# Patient Record
Sex: MALE | Race: BLACK OR AFRICAN AMERICAN | Employment: OTHER | URBAN - METROPOLITAN AREA
[De-identification: names, ages, dates, MRNs, and addresses within clinical notes are randomized per-mention and may not be internally consistent; named-entity substitution may affect disease eponyms.]

---

## 2017-01-29 ENCOUNTER — HOSPITAL ENCOUNTER (EMERGENCY)
Facility: HOSPITAL | Age: 42
Discharge: HOME | End: 2017-01-29
Payer: MEDICARE

## 2017-01-29 VITALS
RESPIRATION RATE: 16 BRPM | TEMPERATURE: 98 F | HEART RATE: 86 BPM | SYSTOLIC BLOOD PRESSURE: 123 MMHG | WEIGHT: 235 LBS | BODY MASS INDEX: 29.22 KG/M2 | DIASTOLIC BLOOD PRESSURE: 79 MMHG | HEIGHT: 75 IN | OXYGEN SATURATION: 99 %

## 2017-01-29 DIAGNOSIS — S90.425A: Primary | ICD-10-CM

## 2017-01-29 PROCEDURE — 99283 EMERGENCY DEPT VISIT LOW MDM: CPT

## 2017-01-29 RX ORDER — IBUPROFEN 600 MG/1
600 TABLET ORAL EVERY 6 HOURS PRN
Qty: 20 TABLET | Refills: 0 | Status: SHIPPED | OUTPATIENT
Start: 2017-01-29 | End: 2017-02-03

## 2017-01-29 RX ORDER — LISINOPRIL 20 MG/1
20 TABLET ORAL DAILY
COMMUNITY

## 2017-01-29 NOTE — ED INITIAL ASSESSMENT (HPI)
Patient states he feels like his the toes on his L foot are frostbitten, toes blackened at time of triage, patient states it was extremely painful but now they are numb

## 2017-01-29 NOTE — ED NOTES
Pt with c/o numbness to tips and blackened skin to to left 1st and 2nd toes for past 2 weeks, pt reports his feet got cold while outside 2 weeks ago,   He was outside for approx 1 hours, was wearing boots, but not wearing socks  Pedal pulse present, toes m

## 2017-01-29 NOTE — ED PROVIDER NOTES
Patient Seen in: [facility] AND [facility] Emergency Department    History   CC: darkened toes  HPI: Cam Lee 42year old male  who presents to the ER c/o left foot first and second distal toe \"black areas\" for the past couple of weeks.   Patient states a c to the distal tip of the patient's left great toe. No nail involvement. No open wounds. The left distal first and second toes appear with darker pigmentation in the rest of the foot. No surrounding erythema or edema is associated.   No tenderness associ

## 2017-04-01 ENCOUNTER — HOSPITAL ENCOUNTER (EMERGENCY)
Facility: HOSPITAL | Age: 42
Discharge: NURSING, REHAB OR LONG-TERM CARE | End: 2017-04-02
Attending: EMERGENCY MEDICINE
Payer: MEDICAID

## 2017-04-01 DIAGNOSIS — F32.A DEPRESSION, UNSPECIFIED DEPRESSION TYPE: Primary | ICD-10-CM

## 2017-04-01 DIAGNOSIS — T14.91XA SUICIDAL BEHAVIOR WITH ATTEMPTED SELF-INJURY (HCC): ICD-10-CM

## 2017-04-01 PROCEDURE — 81003 URINALYSIS AUTO W/O SCOPE: CPT | Performed by: EMERGENCY MEDICINE

## 2017-04-01 PROCEDURE — 83992 ASSAY FOR PHENCYCLIDINE: CPT | Performed by: EMERGENCY MEDICINE

## 2017-04-01 PROCEDURE — 80320 DRUG SCREEN QUANTALCOHOLS: CPT | Performed by: EMERGENCY MEDICINE

## 2017-04-01 PROCEDURE — 99285 EMERGENCY DEPT VISIT HI MDM: CPT

## 2017-04-01 PROCEDURE — 80048 BASIC METABOLIC PNL TOTAL CA: CPT | Performed by: EMERGENCY MEDICINE

## 2017-04-01 PROCEDURE — 85025 COMPLETE CBC W/AUTO DIFF WBC: CPT | Performed by: EMERGENCY MEDICINE

## 2017-04-01 PROCEDURE — 36415 COLL VENOUS BLD VENIPUNCTURE: CPT

## 2017-04-01 PROCEDURE — 80307 DRUG TEST PRSMV CHEM ANLYZR: CPT | Performed by: EMERGENCY MEDICINE

## 2017-04-02 VITALS
WEIGHT: 230 LBS | OXYGEN SATURATION: 100 % | RESPIRATION RATE: 18 BRPM | SYSTOLIC BLOOD PRESSURE: 123 MMHG | BODY MASS INDEX: 29.52 KG/M2 | HEIGHT: 74 IN | DIASTOLIC BLOOD PRESSURE: 81 MMHG | HEART RATE: 65 BPM

## 2017-04-02 NOTE — BH PROGRESS NOTE
Pt accepted at [facility]  Is Dr. Arnold [facility] 322-1    RN to RN: [phone]

## 2017-04-02 NOTE — ED INITIAL ASSESSMENT (HPI)
Ems called for pt outside of his house w/ no shirt on. EMS states he is bipolar and has not been taking his meds.  No hi/si

## 2017-04-02 NOTE — BH PROGRESS NOTE
[facility] does not have a bed  [facility] - reached [facility] does not have a bed    Faxed clinical packet to [facility]   Awaiting response for pt transfer

## 2017-04-02 NOTE — BH LEVEL OF CARE ASSESSMENT
Comprehensive Assessment Note      General Questions  Why are you here?: \"I had an arguement and that's about it. \" Pt reports laying the street because \"I was sad. \"    Precipitating Events: Pt's mother called EMS because pt was laying outside dressed i Suicidal Ideation: No  Date of Most Recent Suicidal Ideation:  (N/A)  Describe Current/Recent Suicidal Ideation: Pt reports no SI, but EMS was called by pt's mother for laying in the street today dressed in only shorts    Current/Recent/Future Suicide Plan Allegations of Inappropriate Physical Contact: No  Current/Recent Destructive Behavior Toward Property: No  Past Destructive Behavior Toward Property: No  Danger to Others/Property Collateral Provided By: Amilcar Madrigal pt's mother    Describe Danger to O beer/mixed drink or two every three to 4 days, pt reports that he has had more then 5 drinks at one time but that does not happen often, could not be more specific)  Tobacco Use: Current smoker  Type: Cigarette  Average Packs/Day: 0.2  Number of Years: 32 in hospital bed)  Speech Characteristics: Normal rate;Normal rhythm; Soft  Concentration: Unimpaired  Memory: Recent memory intact; Remote memory intact (Memory appears to be intact, though the pt was not truthful)  Orientation Level: Oriented X4;Oriented to History: No  12. Poor Social Support: Yes  13.  Alcohol or Drug Abuse: No  Factors Mitigating Risk  Factors Mitigating Risk: Pt did not report being suicidal therefore has no mitigating factors   SRAT Review  Precautions: Suicide

## 2017-04-02 NOTE — ED PROVIDER NOTES
Patient Seen in: [facility] AND [facility] Emergency Department    History   Patient presents with:  Eval-P (psychiatric)    Stated Complaint:     HPI    40-year-old male with history of hypertension, diabetes, and bipolar disorder presents by paramedics after h distress  Eyes: pupils equal and round no pallor or injection  ENT, Mouth: mucous membranes moist  Respiratory: there are no retractions, lungs are clear to auscultation  Cardiovascular: regular rate and rhythm  Gastrointestinal:  abdomen is soft and non t MDM     Patient is medically cleared for inpatient psychiatric care. Plan to transfer the patient for inpatient psychiatric care given what seems to be a suicidal gesture.       Disposition and Plan     Clinical Impression:  Depression, unspecified d

## 2017-04-02 NOTE — ED NOTES
Pt arrives via EMS from home. Per medics, pt's mom called ambulance because pt was sitting outside in the street w/out a shirt on. Per medics pt has a hx of bipolar and has not been taking his medication.  Upon arrival pt is withdrawn only nodding his head

## 2018-03-18 ENCOUNTER — APPOINTMENT (OUTPATIENT)
Dept: ULTRASOUND IMAGING | Facility: HOSPITAL | Age: 43
End: 2018-03-18
Attending: EMERGENCY MEDICINE
Payer: MEDICARE

## 2018-03-18 ENCOUNTER — HOSPITAL ENCOUNTER (EMERGENCY)
Facility: HOSPITAL | Age: 43
Discharge: HOME | End: 2018-03-18
Attending: EMERGENCY MEDICINE
Payer: MEDICARE

## 2018-03-18 VITALS
TEMPERATURE: 98 F | RESPIRATION RATE: 16 BRPM | OXYGEN SATURATION: 99 % | HEART RATE: 76 BPM | DIASTOLIC BLOOD PRESSURE: 79 MMHG | SYSTOLIC BLOOD PRESSURE: 121 MMHG

## 2018-03-18 DIAGNOSIS — I82.432 ACUTE DEEP VEIN THROMBOSIS (DVT) OF POPLITEAL VEIN OF LEFT LOWER EXTREMITY (HCC): Primary | ICD-10-CM

## 2018-03-18 LAB
ANION GAP SERPL CALC-SCNC: 8 MMOL/L (ref 0–18)
APTT PPP: 26 SECONDS (ref 23.2–35.3)
BASOPHILS # BLD: 0 K/UL (ref 0–0.2)
BASOPHILS NFR BLD: 0 %
BUN SERPL-MCNC: 9 MG/DL (ref 8–20)
BUN/CREAT SERPL: 10.7 (ref 10–20)
CALCIUM SERPL-MCNC: 8.9 MG/DL (ref 8.5–10.5)
CHLORIDE SERPL-SCNC: 103 MMOL/L (ref 95–110)
CO2 SERPL-SCNC: 27 MMOL/L (ref 22–32)
CREAT SERPL-MCNC: 0.84 MG/DL (ref 0.5–1.5)
EOSINOPHIL # BLD: 0.2 K/UL (ref 0–0.7)
EOSINOPHIL NFR BLD: 3 %
ERYTHROCYTE [DISTWIDTH] IN BLOOD BY AUTOMATED COUNT: 15.2 % (ref 11–15)
GLUCOSE SERPL-MCNC: 117 MG/DL (ref 70–99)
HCT VFR BLD AUTO: 40.6 % (ref 41–52)
HGB BLD-MCNC: 13.3 G/DL (ref 13.5–17.5)
INR BLD: 1.1 (ref 0.9–1.2)
LYMPHOCYTES # BLD: 1.4 K/UL (ref 1–4)
LYMPHOCYTES NFR BLD: 26 %
MCH RBC QN AUTO: 25.7 PG (ref 27–32)
MCHC RBC AUTO-ENTMCNC: 32.8 G/DL (ref 32–37)
MCV RBC AUTO: 78.4 FL (ref 80–100)
MONOCYTES # BLD: 0.3 K/UL (ref 0–1)
MONOCYTES NFR BLD: 6 %
NEUTROPHILS # BLD AUTO: 3.5 K/UL (ref 1.8–7.7)
NEUTROPHILS NFR BLD: 64 %
OSMOLALITY UR CALC.SUM OF ELEC: 286 MOSM/KG (ref 275–295)
PLATELET # BLD AUTO: 340 K/UL (ref 140–400)
PMV BLD AUTO: 7.7 FL (ref 7.4–10.3)
POTASSIUM SERPL-SCNC: 3.7 MMOL/L (ref 3.3–5.1)
PROTHROMBIN TIME: 13.8 SECONDS (ref 11.8–14.5)
RBC # BLD AUTO: 5.18 M/UL (ref 4.5–5.9)
SODIUM SERPL-SCNC: 138 MMOL/L (ref 136–144)
WBC # BLD AUTO: 5.5 K/UL (ref 4–11)

## 2018-03-18 PROCEDURE — 80048 BASIC METABOLIC PNL TOTAL CA: CPT | Performed by: EMERGENCY MEDICINE

## 2018-03-18 PROCEDURE — 85730 THROMBOPLASTIN TIME PARTIAL: CPT | Performed by: EMERGENCY MEDICINE

## 2018-03-18 PROCEDURE — 85610 PROTHROMBIN TIME: CPT | Performed by: EMERGENCY MEDICINE

## 2018-03-18 PROCEDURE — 99284 EMERGENCY DEPT VISIT MOD MDM: CPT

## 2018-03-18 PROCEDURE — 36415 COLL VENOUS BLD VENIPUNCTURE: CPT

## 2018-03-18 PROCEDURE — 93971 EXTREMITY STUDY: CPT | Performed by: EMERGENCY MEDICINE

## 2018-03-18 PROCEDURE — 85025 COMPLETE CBC W/AUTO DIFF WBC: CPT | Performed by: EMERGENCY MEDICINE

## 2018-03-18 NOTE — ED PROVIDER NOTES
Patient Seen in: [facility] AND [facility] Emergency Department    History   Patient presents with:  Swelling Edema (cardiovascular, metabolic)    Stated Complaint: L leg swelling, calf pain     HPI    She presents emergency department complaining of left calf a distension. There is no tenderness. Musculoskeletal:   There is swelling and edema of the left lower leg from the ankle to the knee. There is no joint involvement. There is no erythema or warmth. There is no cellulitic changes.   There are strong pulse ED. Will d/c on same.        Disposition and Plan     Clinical Impression:  Acute deep vein thrombosis (DVT) of popliteal vein of left lower extremity ([address]  (primary encounter diagnosis)    Disposition:  Discharge  3/18/2018  4:20 pm    Follow-up:  Hawa Villegas

## 2018-05-03 ENCOUNTER — APPOINTMENT (OUTPATIENT)
Dept: ULTRASOUND IMAGING | Facility: HOSPITAL | Age: 43
End: 2018-05-03
Payer: MEDICARE

## 2018-05-03 ENCOUNTER — HOSPITAL ENCOUNTER (EMERGENCY)
Facility: HOSPITAL | Age: 43
Discharge: HOME | End: 2018-05-03
Payer: MEDICARE

## 2018-05-03 ENCOUNTER — APPOINTMENT (OUTPATIENT)
Dept: GENERAL RADIOLOGY | Facility: HOSPITAL | Age: 43
End: 2018-05-03
Payer: MEDICARE

## 2018-05-03 VITALS
DIASTOLIC BLOOD PRESSURE: 98 MMHG | HEIGHT: 74 IN | OXYGEN SATURATION: 100 % | SYSTOLIC BLOOD PRESSURE: 133 MMHG | HEART RATE: 76 BPM | RESPIRATION RATE: 18 BRPM | TEMPERATURE: 98 F | BODY MASS INDEX: 28.62 KG/M2 | WEIGHT: 223 LBS

## 2018-05-03 DIAGNOSIS — J40 BRONCHITIS: Primary | ICD-10-CM

## 2018-05-03 PROCEDURE — 93005 ELECTROCARDIOGRAM TRACING: CPT

## 2018-05-03 PROCEDURE — 93971 EXTREMITY STUDY: CPT

## 2018-05-03 PROCEDURE — 80048 BASIC METABOLIC PNL TOTAL CA: CPT

## 2018-05-03 PROCEDURE — 71046 X-RAY EXAM CHEST 2 VIEWS: CPT

## 2018-05-03 PROCEDURE — 94640 AIRWAY INHALATION TREATMENT: CPT

## 2018-05-03 PROCEDURE — 36415 COLL VENOUS BLD VENIPUNCTURE: CPT

## 2018-05-03 PROCEDURE — 99284 EMERGENCY DEPT VISIT MOD MDM: CPT

## 2018-05-03 PROCEDURE — 85025 COMPLETE CBC W/AUTO DIFF WBC: CPT

## 2018-05-03 PROCEDURE — 93010 ELECTROCARDIOGRAM REPORT: CPT | Performed by: INTERNAL MEDICINE

## 2018-05-03 RX ORDER — PREDNISONE 20 MG/1
40 TABLET ORAL DAILY
Qty: 8 TABLET | Refills: 0 | Status: SHIPPED | OUTPATIENT
Start: 2018-05-03 | End: 2018-05-07

## 2018-05-03 RX ORDER — PREDNISONE 20 MG/1
40 TABLET ORAL ONCE
Status: COMPLETED | OUTPATIENT
Start: 2018-05-03 | End: 2018-05-03

## 2018-05-03 RX ORDER — IPRATROPIUM BROMIDE AND ALBUTEROL SULFATE 2.5; .5 MG/3ML; MG/3ML
3 SOLUTION RESPIRATORY (INHALATION) ONCE
Status: COMPLETED | OUTPATIENT
Start: 2018-05-03 | End: 2018-05-03

## 2018-05-03 RX ORDER — ALBUTEROL SULFATE 90 UG/1
2 AEROSOL, METERED RESPIRATORY (INHALATION) EVERY 4 HOURS PRN
Qty: 1 INHALER | Refills: 0 | Status: SHIPPED | OUTPATIENT
Start: 2018-05-03 | End: 2018-06-02

## 2018-05-03 NOTE — ED INITIAL ASSESSMENT (HPI)
Pt was Dx with left leg DVT 3 weeks ago, 2 weeks ago noticed wheezing, denies SOB. Pt also noticed right left swelling today, denies chest pain, leg pain.

## 2018-05-03 NOTE — ED PROVIDER NOTES
Patient Seen in: [facility] AND [facility] Emergency Department    History   Patient presents with:  Deep Vein Thrombosis (cardiovascular)    Stated Complaint: Recent dx of Left leg DVT- was on eliquis and stopped per MD - now c/o right le*    HPI    37 yo male Neck: Normal range of motion. Neck supple. Cardiovascular: Normal rate, regular rhythm, normal heart sounds and intact distal pulses. Pulmonary/Chest: Effort normal and breath sounds normal. He has no wheezes.    Decreased air exchange   Abdominal: S Course as of May 03 1621  ------------------------------------------------------------      MDM   Ultrasound of the right leg negative for DVT. CXR no acute findings. reexam at 415. Air exchange improved. Discharge home on albuterol and prednisone.  P

## 2019-09-07 NOTE — ED INITIAL ASSESSMENT (HPI)
Patient arrives via EMS from home. Apparently he made a comment to his mother that somewhere along the lines that if he can't pay his bills, he'll drink anti-freeze to end his life.  An empty bottle of anti freeze was found in the house, but patient denies

## 2019-09-07 NOTE — ED NOTES
Patient calm and cooperative, security called for belongings check. Josh EDT at bedside for seclusion.

## 2019-09-07 NOTE — BH LEVEL OF CARE ASSESSMENT
Level of Care Assessment Note    General Questions  Why are you here?: \"I was angry and I told her I would drink antifreeze but I was not serious but she called the ambulance anyways. \"   Precipitating Events: Pt states that he is struggling to pay bills his life with the antifreeze today)  3. Have you been thinking about how you might kill yourself? (past 30 days): Yes(Pt denied but it was clear antifreeze was his plan or thought of means)  4.  Have you had these thoughts and had some intention of acting o access to means to attempt suicide or harm others or property: No  Access to Firearm/Weapon: No  Do you have a firearm owner ID card?: No    Self Injury  History of Self Injurious Behaviors: No  Present Self-Injurious Behaviors: No    Mental Health Symptom supportive place for recovery?: No     Withdrawal Symptoms  Current Withdrawal Symptoms: No                                                   Functional Impairment  Currently Attending School: No  Employment Status: Unemployed  Job Issues: (n/a)  Concerns/ with his mother. Pt stated that he did not mean it and he was hoping it would make his mother pay his bills for him. Pt states he has been struggling financially and was asking mother for help for the current time and she was not helping him.  Mother ended

## 2019-09-07 NOTE — ED PROVIDER NOTES
Patient Seen in: [facility] AND [facility] Emergency Department    History   Patient presents with:  Eval-P (psychiatric)    Stated Complaint: psych eval    HPI    Patient is a 41-year-old male he states he was sitting in a conversation with his mother.   He was and reactive to light. Neck: Neck supple. Cardiovascular: Normal rate, regular rhythm, normal heart sounds and intact distal pulses. Pulmonary/Chest: Effort normal and breath sounds normal. No respiratory distress. Abdominal: Soft.  Bowel sounds ar DRAW LAVENDER   RAINBOW DRAW LIGHT GREEN   RAINBOW DRAW GOLD          Patient's mother came in and spoke with [facility]. She filled out a petition as she feels patient is a risk for suicide.     MDM                 Disposition and Plan     Clinical

## 2019-09-08 NOTE — ED NOTES
[facility] - no beds  [facility] and [place] - no beds, no clinical given  [facility] - no beds, no clinical given

## 2019-09-08 NOTE — ED NOTES
Told by [facility] that the pt would have to be transferred out

## 2020-05-18 ENCOUNTER — HOSPITAL ENCOUNTER (EMERGENCY)
Facility: HOSPITAL | Age: 45
Discharge: HOME | End: 2020-05-18
Attending: EMERGENCY MEDICINE
Payer: MEDICARE

## 2020-05-18 VITALS
OXYGEN SATURATION: 99 % | BODY MASS INDEX: 28.88 KG/M2 | WEIGHT: 225 LBS | DIASTOLIC BLOOD PRESSURE: 88 MMHG | TEMPERATURE: 99 F | HEIGHT: 74 IN | RESPIRATION RATE: 16 BRPM | SYSTOLIC BLOOD PRESSURE: 124 MMHG | HEART RATE: 90 BPM

## 2020-05-18 DIAGNOSIS — E11.65 TYPE 2 DIABETES MELLITUS WITH HYPERGLYCEMIA, WITHOUT LONG-TERM CURRENT USE OF INSULIN (HCC): Primary | ICD-10-CM

## 2020-05-18 PROCEDURE — 82962 GLUCOSE BLOOD TEST: CPT

## 2020-05-18 PROCEDURE — 85025 COMPLETE CBC W/AUTO DIFF WBC: CPT | Performed by: EMERGENCY MEDICINE

## 2020-05-18 PROCEDURE — 96372 THER/PROPH/DIAG INJ SC/IM: CPT

## 2020-05-18 PROCEDURE — 80048 BASIC METABOLIC PNL TOTAL CA: CPT | Performed by: EMERGENCY MEDICINE

## 2020-05-18 PROCEDURE — 96360 HYDRATION IV INFUSION INIT: CPT

## 2020-05-18 PROCEDURE — 96361 HYDRATE IV INFUSION ADD-ON: CPT

## 2020-05-18 PROCEDURE — 81003 URINALYSIS AUTO W/O SCOPE: CPT | Performed by: EMERGENCY MEDICINE

## 2020-05-18 PROCEDURE — 99284 EMERGENCY DEPT VISIT MOD MDM: CPT

## 2020-05-18 RX ORDER — INSULIN ASPART 100 [IU]/ML
0.2 INJECTION, SOLUTION INTRAVENOUS; SUBCUTANEOUS ONCE
Status: COMPLETED | OUTPATIENT
Start: 2020-05-18 | End: 2020-05-18

## 2020-05-18 RX ORDER — SODIUM CHLORIDE 9 MG/ML
1000 INJECTION, SOLUTION INTRAVENOUS ONCE
Status: COMPLETED | OUTPATIENT
Start: 2020-05-18 | End: 2020-05-18

## 2020-05-18 NOTE — ED NOTES
Pt presents with c/o elevated blood glucose reading \"HI\" at home. Pt states he is supposed to take Metformin 1000mg BID instead he takes it once in the morning or at night. Last dose was last night. Education provided on proper frequency at this time.  Pt

## 2020-05-19 NOTE — ED PROVIDER NOTES
Patient Seen in: [facility] AND [facility] Emergency Department    History   Patient presents with:  Hyperglycemia      HPI    Patient presents to the ED complaining of high blood sugar readings.   He states that he has been feeling tired and more thirsty over th 1645 96 %   O2 Device 05/18/20 1645 None (Room air)       All measures to prevent infection transmission during my interaction with the patient were taken. The patient was already wearing a droplet mask on my arrival to the room.  Personal protective equipm GLUCOSE - Abnormal; Notable for the following components:    POC Glucose  459 (*)     All other components within normal limits   POCT GLUCOSE - Abnormal; Notable for the following components:    POC Glucose  294 (*)     All other components within normal Diagnostic Considerations: Hyperglycemia, DKA, electrolyte abnormalities    Medical Record Review: I personally reviewed available prior medical records for any recent pertinent discharge summaries, testing, and procedures and reviewed those reports.     Co

## 2020-05-23 ENCOUNTER — HOSPITAL ENCOUNTER (EMERGENCY)
Facility: HOSPITAL | Age: 45
Discharge: HOME | End: 2020-05-23
Attending: EMERGENCY MEDICINE
Payer: MEDICARE

## 2020-05-23 VITALS
OXYGEN SATURATION: 98 % | RESPIRATION RATE: 16 BRPM | TEMPERATURE: 98 F | DIASTOLIC BLOOD PRESSURE: 86 MMHG | HEART RATE: 85 BPM | SYSTOLIC BLOOD PRESSURE: 120 MMHG

## 2020-05-23 DIAGNOSIS — E11.65 TYPE 2 DIABETES MELLITUS WITH HYPERGLYCEMIA, WITHOUT LONG-TERM CURRENT USE OF INSULIN (HCC): Primary | ICD-10-CM

## 2020-05-23 PROCEDURE — 96360 HYDRATION IV INFUSION INIT: CPT

## 2020-05-23 PROCEDURE — 82962 GLUCOSE BLOOD TEST: CPT

## 2020-05-23 PROCEDURE — 83690 ASSAY OF LIPASE: CPT | Performed by: EMERGENCY MEDICINE

## 2020-05-23 PROCEDURE — 96361 HYDRATE IV INFUSION ADD-ON: CPT

## 2020-05-23 PROCEDURE — 85025 COMPLETE CBC W/AUTO DIFF WBC: CPT | Performed by: EMERGENCY MEDICINE

## 2020-05-23 PROCEDURE — 96372 THER/PROPH/DIAG INJ SC/IM: CPT

## 2020-05-23 PROCEDURE — 80076 HEPATIC FUNCTION PANEL: CPT | Performed by: EMERGENCY MEDICINE

## 2020-05-23 PROCEDURE — 99285 EMERGENCY DEPT VISIT HI MDM: CPT

## 2020-05-23 PROCEDURE — 80048 BASIC METABOLIC PNL TOTAL CA: CPT | Performed by: EMERGENCY MEDICINE

## 2020-05-23 RX ORDER — GLIPIZIDE 10 MG/1
TABLET ORAL
Qty: 30 TABLET | Refills: 0 | Status: SHIPPED | OUTPATIENT
Start: 2020-05-23

## 2020-05-23 RX ORDER — ONDANSETRON 4 MG/1
4 TABLET, ORALLY DISINTEGRATING ORAL EVERY 8 HOURS PRN
Qty: 10 TABLET | Refills: 0 | Status: SHIPPED | OUTPATIENT
Start: 2020-05-23 | End: 2020-05-30

## 2020-05-23 NOTE — ED INITIAL ASSESSMENT (HPI)
Pt c/o hyperglycemia \"almost 500\", pt is DM2. Pt takes oral meds, has been compliant. Pt recently discharged form [address] [place] [address] for same problem. Pt's only complaint is feeling fatigued.

## 2020-05-24 ENCOUNTER — TELEPHONE (OUTPATIENT)
Dept: ENDOCRINOLOGY CLINIC | Facility: CLINIC | Age: 45
End: 2020-05-24

## 2020-05-24 NOTE — ED NOTES
[facility] 2nd bolus of fluids infusing at this time. Needs repeat accucheck.

## 2020-05-24 NOTE — ED PROVIDER NOTES
Patient Seen in: [facility] AND [facility] Emergency Department      History   Patient presents with:  Hyperglycemia    Stated Complaint: high blood sugar    HPI    Patient presents to the emergency department stating that his blood sugars almost 500.   He states Rate and Rhythm: Normal rate and regular rhythm. Heart sounds: No murmur. Pulmonary:      Effort: Pulmonary effort is normal. No respiratory distress. Breath sounds: Normal breath sounds. Abdominal:      General: There is no distension. were created for panel order CBC WITH DIFFERENTIAL WITH PLATELET.   Procedure                               Abnormality         Status                     ---------                               -----------         ------                     CBC W/ DIFFERTANMAY

## 2020-05-25 NOTE — TELEPHONE ENCOUNTER
Patient was in the ER with hyperglycemia  Needs Fu in clinic  Please call and book with Severino Nicholson

## 2020-06-05 NOTE — TELEPHONE ENCOUNTER
Attempted to contact the patient, no answer, but no voicemail. Will call patient again at a later time.   Chart flagged to call again next week 6/8

## 2020-06-05 NOTE — TELEPHONE ENCOUNTER
Endo staff: please try to reach out to patient and schedule a post ED visit f/u with me    Thank you

## 2020-06-18 NOTE — TELEPHONE ENCOUNTER
Tried to call patient today. No answer and no voicemail. This is the 3ed attempt of calling patient to help schedule f/u apt s/p ED visit. Letter was generated and sent out to patients home address as listed on Chart review. Thank you.

## 2020-06-29 NOTE — TELEPHONE ENCOUNTER
Patient called back and relayed message from Dr. Rosy Rayo. He accepted below appointment.   Per patient he has the address to the office but RN still reviewed it with him    Future Appointments   Date Time Provider Ryan Tovar   7/6/2020  2:30 PM Is

## 2020-07-06 ENCOUNTER — OFFICE VISIT (OUTPATIENT)
Dept: ENDOCRINOLOGY CLINIC | Facility: CLINIC | Age: 45
End: 2020-07-06
Payer: MEDICARE

## 2020-07-06 VITALS
HEART RATE: 105 BPM | DIASTOLIC BLOOD PRESSURE: 90 MMHG | SYSTOLIC BLOOD PRESSURE: 132 MMHG | BODY MASS INDEX: 32 KG/M2 | WEIGHT: 245.81 LBS

## 2020-07-06 DIAGNOSIS — I10 ESSENTIAL HYPERTENSION: ICD-10-CM

## 2020-07-06 DIAGNOSIS — E11.65 TYPE 2 DIABETES MELLITUS WITH HYPERGLYCEMIA, WITHOUT LONG-TERM CURRENT USE OF INSULIN (HCC): Primary | ICD-10-CM

## 2020-07-06 LAB
CARTRIDGE LOT#: ABNORMAL NUMERIC
GLUCOSE BLOOD: 200
HEMOGLOBIN A1C: 11.8 % (ref 4.3–5.6)
TEST STRIP LOT #: NORMAL NUMERIC

## 2020-07-06 PROCEDURE — 83036 HEMOGLOBIN GLYCOSYLATED A1C: CPT | Performed by: NURSE PRACTITIONER

## 2020-07-06 PROCEDURE — 82962 GLUCOSE BLOOD TEST: CPT | Performed by: NURSE PRACTITIONER

## 2020-07-06 PROCEDURE — 99214 OFFICE O/P EST MOD 30 MIN: CPT | Performed by: NURSE PRACTITIONER

## 2020-07-06 PROCEDURE — 36416 COLLJ CAPILLARY BLOOD SPEC: CPT | Performed by: NURSE PRACTITIONER

## 2020-07-06 RX ORDER — PIOGLITAZONEHYDROCHLORIDE 30 MG/1
30 TABLET ORAL DAILY
Qty: 90 TABLET | Refills: 0 | Status: SHIPPED | OUTPATIENT
Start: 2020-07-06

## 2020-07-06 NOTE — PATIENT INSTRUCTIONS
We are here to support you with Diabetes but please remember that you still need your primary care doctor for your routine health maintenance.    Your A1C: 11.8% today   This is too high for you and we will work together on lowering your blood sugars to hel Blood sugars greater than 200 are not acceptable to reach your goal of improving diabetes      Watch for low blood sugars: (less than 70 )  Treatment of Low Blood Glucose Action Plan  1. Check blood glucose to be sure that it is low.  You can’t always go

## 2020-07-06 NOTE — PROGRESS NOTES
Name: Shauna Patel  Date: 7/6/2020    Referring Physician: No ref. provider found    CHIEF COMPLAINT   No chief complaint on file. HISTORY OF PRESENT ILLNESS   Shauna Patel is a 38year old male who presents for new consultation on diabetes management. Weight lifting bu no aerobic exercises     Polyuria, polyphagia, polydipsia: yes  Paresthesias: no  Blurred vision: yes  Recent steroids, illness or infections: no     REVIEW OF SYSTEMS  Constitutional: Negative for: weight change, fever, fatigue, cold/h Smoking status: Current Some Day Smoker      Smokeless tobacco: Never Used    PAST MEDICAL HISTORY:   Past Medical History:   Diagnosis Date   • Back pain    • Bipolar affective ([facility]    • Diabetes ([facility]    • DVT (deep venous thrombosis) ([facility]    • Essenti active disease or of evidence of heart failure or fluid overload, skeletal fractures, liver disease or bladder cancer.   -discussed starting [place] insulin option, however patient declined. -Jason Coronado was d/c'ed in the past due to lack of glycemic control. spent counseling the patient and/or coordinating care related to their office. visit.      7/6/2020  BOBO Asencio

## 2024-10-28 NOTE — ED AVS SNAPSHOT
Casimiro Jaime   MRN: [id number]    Department:  [facility] Emergency Department   Date of Visit:  5/3/2018           Disclosure     Insurance plans vary and the physician(s) referred by the ER may not be covered by your plan.  Please contact your CARE PHYSICIAN AT ONCE OR RETURN IMMEDIATELY TO THE EMERGENCY DEPARTMENT. If you have been prescribed any medication(s), please fill your prescription right away and begin taking the medication(s) as directed.   If you believe that any of the medications yes

## (undated) NOTE — ED AVS SNAPSHOT
[facility] Emergency Department    [address]     [place] [address] [place] [address]    Phone:  [phone]    Fax:  [phone]           Maribell Colbert   MRN: [id number]    Department:  [facility] Emergency Department   Date of Visit:  1/29/20 one provided in your discharge instructions in the next 2-3 days. Return to the ER for new or worsening symptoms, fever or increasing pain.       Discharge References/Attachments     BLISTER (ADULT) (ENGLISH)    FROSTBITE (ENGLISH)      Disclosure     Insur visiting [email or web address]    IF THERE IS ANY CHANGE OR WORSENING OF YOUR CONDITION, CALL YOUR PRIMARY CARE PHYSICIAN AT ONCE OR RETURN IMMEDIATELY TO THE EMERGENCY DEPARTMENT.     If you have been prescribed any medication(s), please fill your prescription - If you don’t have insurance, David Christiansen has partnered with [facility] to help you get signed up for insurance coverage.   [facility] is a [employer] program that can help with your Affordable Care Act cover

## (undated) NOTE — ED AVS SNAPSHOT
[facility] Emergency Department    [address]     [place] [address]    Phone:  [phone]    Fax:  [phone]           Kennedy Echols   MRN: [id number]    Department:  [facility] Emergency Department   Date of Visit:  1/29/20 and Class Registration line at [phone] or find a doctor online by visiting [email or web address].    IF THERE IS ANY CHANGE OR WORSENING OF YOUR CONDITION, CALL YOUR PRIMARY CARE PHYSICIAN AT ONCE OR RETURN IMMEDIATELY TO [address] [place] [address].     If

## (undated) NOTE — LETTER
March 18, 2018    Patient: Casimiro Jaime   Date of Visit: 3/18/2018       To Whom It May Concern:    Casimiro Jaime was seen and treated in our emergency department on 3/18/2018. He can return to work Thursday 3/22/18.     If you have any questions or conc